# Patient Record
Sex: MALE | Race: WHITE | Employment: OTHER | URBAN - METROPOLITAN AREA
[De-identification: names, ages, dates, MRNs, and addresses within clinical notes are randomized per-mention and may not be internally consistent; named-entity substitution may affect disease eponyms.]

---

## 2020-06-12 ENCOUNTER — HOSPITAL ENCOUNTER (EMERGENCY)
Age: 75
Discharge: HOME | End: 2020-06-13
Attending: EMERGENCY MEDICINE
Payer: MEDICARE

## 2020-06-12 PROCEDURE — 12013 RPR F/E/E/N/L/M 2.6-5.0 CM: CPT

## 2020-06-12 PROCEDURE — 99284 EMERGENCY DEPT VISIT MOD MDM: CPT

## 2020-06-13 ENCOUNTER — APPOINTMENT (OUTPATIENT)
Dept: CT IMAGING | Age: 75
End: 2020-06-13
Payer: MEDICARE

## 2020-06-13 VITALS
RESPIRATION RATE: 16 BRPM | TEMPERATURE: 98 F | SYSTOLIC BLOOD PRESSURE: 149 MMHG | HEART RATE: 65 BPM | WEIGHT: 164 LBS | DIASTOLIC BLOOD PRESSURE: 88 MMHG | OXYGEN SATURATION: 97 %

## 2020-06-13 LAB
ANION GAP SERPL CALCULATED.3IONS-SCNC: 21 MMOL/L (ref 7–16)
BASOPHILS ABSOLUTE: 0.19 E9/L (ref 0–0.2)
BASOPHILS RELATIVE PERCENT: 2.6 % (ref 0–2)
BUN BLDV-MCNC: 13 MG/DL (ref 8–23)
BURR CELLS: ABNORMAL
CALCIUM SERPL-MCNC: 8.7 MG/DL (ref 8.6–10.2)
CHLORIDE BLD-SCNC: 99 MMOL/L (ref 98–107)
CO2: 13 MMOL/L (ref 22–29)
CREAT SERPL-MCNC: 1 MG/DL (ref 0.7–1.2)
EOSINOPHILS ABSOLUTE: 0.06 E9/L (ref 0.05–0.5)
EOSINOPHILS RELATIVE PERCENT: 0.9 % (ref 0–6)
GFR AFRICAN AMERICAN: >60
GFR NON-AFRICAN AMERICAN: >60 ML/MIN/1.73
GLUCOSE BLD-MCNC: 99 MG/DL (ref 74–99)
HCT VFR BLD CALC: 43.3 % (ref 37–54)
HEMOGLOBIN: 14.8 G/DL (ref 12.5–16.5)
LYMPHOCYTES ABSOLUTE: 2.16 E9/L (ref 1.5–4)
LYMPHOCYTES RELATIVE PERCENT: 30.4 % (ref 20–42)
MCH RBC QN AUTO: 34.3 PG (ref 26–35)
MCHC RBC AUTO-ENTMCNC: 34.2 % (ref 32–34.5)
MCV RBC AUTO: 100.5 FL (ref 80–99.9)
MONOCYTES ABSOLUTE: 0.65 E9/L (ref 0.1–0.95)
MONOCYTES RELATIVE PERCENT: 8.7 % (ref 2–12)
NEUTROPHILS ABSOLUTE: 4.1 E9/L (ref 1.8–7.3)
NEUTROPHILS RELATIVE PERCENT: 57.4 % (ref 43–80)
OVALOCYTES: ABNORMAL
PDW BLD-RTO: 12.3 FL (ref 11.5–15)
PLATELET # BLD: 193 E9/L (ref 130–450)
PMV BLD AUTO: 10.3 FL (ref 7–12)
POIKILOCYTES: ABNORMAL
POLYCHROMASIA: ABNORMAL
POTASSIUM SERPL-SCNC: 3.5 MMOL/L (ref 3.5–5)
RBC # BLD: 4.31 E12/L (ref 3.8–5.8)
SODIUM BLD-SCNC: 133 MMOL/L (ref 132–146)
TROPONIN: <0.01 NG/ML (ref 0–0.03)
WBC # BLD: 7.2 E9/L (ref 4.5–11.5)

## 2020-06-13 PROCEDURE — 90471 IMMUNIZATION ADMIN: CPT | Performed by: PHYSICIAN ASSISTANT

## 2020-06-13 PROCEDURE — 84484 ASSAY OF TROPONIN QUANT: CPT

## 2020-06-13 PROCEDURE — 72125 CT NECK SPINE W/O DYE: CPT

## 2020-06-13 PROCEDURE — 6360000002 HC RX W HCPCS: Performed by: PHYSICIAN ASSISTANT

## 2020-06-13 PROCEDURE — 70486 CT MAXILLOFACIAL W/O DYE: CPT

## 2020-06-13 PROCEDURE — 80048 BASIC METABOLIC PNL TOTAL CA: CPT

## 2020-06-13 PROCEDURE — 36415 COLL VENOUS BLD VENIPUNCTURE: CPT

## 2020-06-13 PROCEDURE — 90715 TDAP VACCINE 7 YRS/> IM: CPT | Performed by: PHYSICIAN ASSISTANT

## 2020-06-13 PROCEDURE — 70450 CT HEAD/BRAIN W/O DYE: CPT

## 2020-06-13 PROCEDURE — 93005 ELECTROCARDIOGRAM TRACING: CPT | Performed by: PHYSICIAN ASSISTANT

## 2020-06-13 PROCEDURE — 85025 COMPLETE CBC W/AUTO DIFF WBC: CPT

## 2020-06-13 RX ORDER — APREMILAST 30 MG/1
TABLET, FILM COATED ORAL
COMMUNITY
End: 2022-06-09

## 2020-06-13 RX ADMIN — TETANUS TOXOID, REDUCED DIPHTHERIA TOXOID AND ACELLULAR PERTUSSIS VACCINE, ADSORBED 0.5 ML: 5; 2.5; 8; 8; 2.5 SUSPENSION INTRAMUSCULAR at 01:19

## 2020-06-13 NOTE — ED PROVIDER NOTES
Interpretation: Normal.   ED Triage Vitals [06/13/20 0005]   BP Temp Temp Source Pulse Resp SpO2 Height Weight   133/82 98 °F (36.7 °C) Oral 70 16 97 % -- 164 lb (74.4 kg)       Physical Exam  · Constitutional/General: Alert and oriented x3, well appearing, non toxic. · HEENT: Patient has a 3.0 cm laceration to the right eyebrow with some mild superior orbital swelling. No tenderness to palpation in this area. Extraocular eye movements are intact bilaterally without any evidence of entrapment. PERRLA,  Airway patent. No other bony facial tenderness to palpation, no other lacerations. There is a slight abrasion to the bridge of the nose without any active bleeding or laceration present. · Neck: Patient is in a c-collar. Denies any midline cervical tenderness to palpation. · Respiratory: Lungs clear to auscultation bilaterally, no wheezes, rales, or rhonchi. Not in respiratory distress  · CV:  Regular rate. Regular rhythm. No murmurs, gallops, or rubs. 2+ distal pulses  · Chest: No chest wall tenderness  · GI:  Abdomen Soft, Non tender, Non distended. +BS. No rebound, guarding, or rigidity. No pulsatile masses. · Musculoskeletal: Moves all extremities x 4. Warm and well perfused, no clubbing, cyanosis, or edema. Capillary refill <3 seconds. No midline tenderness to palpation of the thoracic or lumbar spine. Patient moves all 4 extremities without difficulty. No tenderness to palpation of the lower extremities including the hips bilaterally. · Integument: skin warm and dry. No rashes.    · Lymphatic: no lymphadenopathy noted  · Neurologic: GCS 15, no focal deficits, symmetric strength 5/5 in the upper and lower extremities bilaterally  · Psychiatric: Normal Affect      Lab / Imaging Results   (All laboratory and radiology results have been personally reviewed by myself)  Labs:  Results for orders placed or performed during the hospital encounter of 71/18/03   Basic Metabolic Panel   Result Value Ref Range    Sodium 133 132 - 146 mmol/L    Potassium 3.5 3.5 - 5.0 mmol/L    Chloride 99 98 - 107 mmol/L    CO2 13 (L) 22 - 29 mmol/L    Anion Gap 21 (H) 7 - 16 mmol/L    Glucose 99 74 - 99 mg/dL    BUN 13 8 - 23 mg/dL    CREATININE 1.0 0.7 - 1.2 mg/dL    GFR Non-African American >60 >=60 mL/min/1.73    GFR African American >60     Calcium 8.7 8.6 - 10.2 mg/dL   Troponin   Result Value Ref Range    Troponin <0.01 0.00 - 0.03 ng/mL   CBC Auto Differential   Result Value Ref Range    WBC 7.2 4.5 - 11.5 E9/L    RBC 4.31 3.80 - 5.80 E12/L    Hemoglobin 14.8 12.5 - 16.5 g/dL    Hematocrit 43.3 37.0 - 54.0 %    .5 (H) 80.0 - 99.9 fL    MCH 34.3 26.0 - 35.0 pg    MCHC 34.2 32.0 - 34.5 %    RDW 12.3 11.5 - 15.0 fL    Platelets 864 382 - 418 E9/L    MPV 10.3 7.0 - 12.0 fL    Neutrophils % 57.4 43.0 - 80.0 %    Lymphocytes % 30.4 20.0 - 42.0 %    Monocytes % 8.7 2.0 - 12.0 %    Eosinophils % 0.9 0.0 - 6.0 %    Basophils % 2.6 (H) 0.0 - 2.0 %    Neutrophils Absolute 4.10 1.80 - 7.30 E9/L    Lymphocytes Absolute 2.16 1.50 - 4.00 E9/L    Monocytes Absolute 0.65 0.10 - 0.95 E9/L    Eosinophils Absolute 0.06 0.05 - 0.50 E9/L    Basophils Absolute 0.19 0.00 - 0.20 E9/L    Polychromasia 1+     Poikilocytes 1+     [place] Cells 1+     Ovalocytes 1+      Imaging: All Radiology results interpreted by Radiologist unless otherwise noted. CT Head WO Contrast   Final Result   1. No acute intracranial hemorrhage, intra-axial mass, or acute territorial   infarct   2. Mild degree of right periorbital, and right forehead soft tissue swelling,   without evidence of a fracture. CT Cervical Spine WO Contrast   Final Result   No evidence of an acute fracture or traumatic malalignment involving the   cervical spine         CT Facial Bones WO Contrast   Final Result   Multifocal facial soft tissue swelling. No underlying acute facial bone   fracture. Globes and orbits are intact.            EKG #1:  Interpreted by emergency department

## 2020-06-13 NOTE — ED NOTES
Bed: 03  Expected date:   Expected time:   Means of arrival:   Comments:  lanes Kerri Lia, RN  06/12/20 6750

## 2020-06-14 LAB
EKG ATRIAL RATE: 72 BPM
EKG P AXIS: 52 DEGREES
EKG P-R INTERVAL: 170 MS
EKG Q-T INTERVAL: 468 MS
EKG QRS DURATION: 156 MS
EKG QTC CALCULATION (BAZETT): 512 MS
EKG R AXIS: 15 DEGREES
EKG T AXIS: 25 DEGREES
EKG VENTRICULAR RATE: 72 BPM

## 2020-06-14 PROCEDURE — 93010 ELECTROCARDIOGRAM REPORT: CPT | Performed by: INTERNAL MEDICINE

## 2021-02-04 ENCOUNTER — IMMUNIZATION (OUTPATIENT)
Dept: PRIMARY CARE CLINIC | Age: 76
End: 2021-02-04
Payer: MEDICARE

## 2021-02-04 PROCEDURE — 91300 COVID-19, PFIZER VACCINE 30MCG/0.3ML DOSE: CPT | Performed by: NURSE PRACTITIONER

## 2021-02-04 PROCEDURE — 0001A COVID-19, PFIZER VACCINE 30MCG/0.3ML DOSE: CPT | Performed by: NURSE PRACTITIONER

## 2021-02-25 ENCOUNTER — IMMUNIZATION (OUTPATIENT)
Dept: PRIMARY CARE CLINIC | Age: 76
End: 2021-02-25
Payer: MEDICARE

## 2021-02-25 PROCEDURE — 91300 COVID-19, PFIZER VACCINE 30MCG/0.3ML DOSE: CPT | Performed by: NURSE PRACTITIONER

## 2021-02-25 PROCEDURE — 0002A COVID-19, PFIZER VACCINE 30MCG/0.3ML DOSE: CPT | Performed by: NURSE PRACTITIONER

## 2022-06-09 ENCOUNTER — OFFICE VISIT (OUTPATIENT)
Dept: RHEUMATOLOGY | Age: 77
End: 2022-06-09
Payer: MEDICARE

## 2022-06-09 VITALS
HEIGHT: 70 IN | RESPIRATION RATE: 16 BRPM | BODY MASS INDEX: 22.76 KG/M2 | SYSTOLIC BLOOD PRESSURE: 146 MMHG | WEIGHT: 159 LBS | OXYGEN SATURATION: 100 % | HEART RATE: 92 BPM | DIASTOLIC BLOOD PRESSURE: 92 MMHG

## 2022-06-09 DIAGNOSIS — Z79.899 HIGH RISK MEDICATION USE: ICD-10-CM

## 2022-06-09 DIAGNOSIS — D84.9 IMMUNOSUPPRESSION (HCC): ICD-10-CM

## 2022-06-09 DIAGNOSIS — L40.50 PSORIATIC ARTHRITIS (HCC): Primary | ICD-10-CM

## 2022-06-09 DIAGNOSIS — I10 ESSENTIAL HYPERTENSION: ICD-10-CM

## 2022-06-09 DIAGNOSIS — Z11.59 ENCOUNTER FOR SCREENING FOR OTHER VIRAL DISEASES: ICD-10-CM

## 2022-06-09 PROCEDURE — G8420 CALC BMI NORM PARAMETERS: HCPCS | Performed by: INTERNAL MEDICINE

## 2022-06-09 PROCEDURE — G8427 DOCREV CUR MEDS BY ELIG CLIN: HCPCS | Performed by: INTERNAL MEDICINE

## 2022-06-09 PROCEDURE — 4004F PT TOBACCO SCREEN RCVD TLK: CPT | Performed by: INTERNAL MEDICINE

## 2022-06-09 PROCEDURE — 1123F ACP DISCUSS/DSCN MKR DOCD: CPT | Performed by: INTERNAL MEDICINE

## 2022-06-09 PROCEDURE — 99205 OFFICE O/P NEW HI 60 MIN: CPT | Performed by: INTERNAL MEDICINE

## 2022-06-09 RX ORDER — CLOBETASOL PROPIONATE 0.5 MG/G
OINTMENT TOPICAL
COMMUNITY
Start: 2022-05-30 | End: 2022-10-24

## 2022-06-09 RX ORDER — IXEKIZUMAB 80 MG/ML
INJECTION, SOLUTION SUBCUTANEOUS
Qty: 4 EACH | Refills: 1 | Status: SHIPPED | OUTPATIENT
Start: 2022-06-09

## 2022-06-09 ASSESSMENT — ENCOUNTER SYMPTOMS
COLOR CHANGE: 0
ABDOMINAL PAIN: 0
VOMITING: 0
NAUSEA: 0
SHORTNESS OF BREATH: 0
COUGH: 0
DIARRHEA: 0
TROUBLE SWALLOWING: 0

## 2022-06-09 NOTE — PROGRESS NOTES
Rakesh Evans 1945 is a 67 y.o. male, here for evaluation of the following chief complaint(s):  New Patient (psoriatic arthritis, seen Dr. Royal Thorpe )         ASSESSMENT/PLAN:    Rakesh Evans 1945 is a 67 y.o. male seen in consult for psoriatic arthritis. 1.  Psoriatic arthritis-he has obvious active synovitis on exam.  He has minimal psoriasis on the left foot. We will stop Horacio Jena and switch him to [employer]. We discussed the risks, benefits, side effects. I will need to get further work-up as below to get a better characterization of his disease. 2.  Immunosuppression-I recommend he stay up-to-date on vaccinations. In the event of any acute infectious illness he should hold Taltz. 3.  Medication monitoring-we will update TB and hepatitis. 4.  Hypertension-patient's with inflammatory arthritis have an increased cardiovascular risk. He is on Micardis    1. P[facility]atic arthritis ([facility]  -     CBC with Auto Differential; Future  -     Comprehensive Metabolic Panel; Future  -     C-Reactive Protein; Future  -     Sedimentation Rate; Future  -     Rheumatoid Factor; Future  -     Cyclic Citrul Peptide Antibody, IgG; Future  -     Hepatitis B Core Antibody, Total; Future  -     Hepatitis B Surface Antibody; Future  -     Hepatitis B Surface Antigen; Future  -     Hepatitis C Antibody; Future  -     T-Spot TB Test; Future  -     XR HAND LEFT (MIN 3 VIEWS); Future  -     XR HAND RIGHT (MIN 3 VIEWS); Future  -     XR FOOT LEFT (MIN 3 VIEWS); Future  -     XR FOOT RIGHT (MIN 3 VIEWS); Future  2. Immunosuppression ([facility]  -     CBC with Auto Differential; Future  -     Comprehensive Metabolic Panel; Future  -     C-Reactive Protein; Future  -     Sedimentation Rate; Future  -     Rheumatoid Factor; Future  -     Cyclic Citrul Peptide Antibody, IgG; Future  -     Hepatitis B Core Antibody, Total; Future  -     Hepatitis B Surface Antibody; Future  -     Hepatitis B Surface Antigen;  Future  -     Hepatitis C Antibody; Future  -     T-Spot TB Test; Future  3. High risk medication use  -     CBC with Auto Differential; Future  -     Comprehensive Metabolic Panel; Future  -     C-Reactive Protein; Future  -     Sedimentation Rate; Future  -     Rheumatoid Factor; Future  -     Cyclic Citrul Peptide Antibody, IgG; Future  -     Hepatitis B Core Antibody, Total; Future  -     Hepatitis B Surface Antibody; Future  -     Hepatitis B Surface Antigen; Future  -     Hepatitis C Antibody; Future  -     T-Spot TB Test; Future  4. Essential hypertension  -     CBC with Auto Differential; Future  -     Comprehensive Metabolic Panel; Future  -     C-Reactive Protein; Future  -     Sedimentation Rate; Future  -     Rheumatoid Factor; Future  -     Cyclic Citrul Peptide Antibody, IgG; Future  -     Hepatitis B Core Antibody, Total; Future  -     Hepatitis B Surface Antibody; Future  -     Hepatitis B Surface Antigen; Future  -     Hepatitis C Antibody; Future  -     T-Spot TB Test; Future  5. Encounter for screening for other viral diseases   -     Hepatitis B Core Antibody, Total; Future  -     Hepatitis B Surface Antibody; Future  -     Hepatitis B Surface Antigen; Future      Return in about 4 months (around 10/9/2022). Subjective   SUBJECTIVE/OBJECTIVE:    HPI: Santhosh Mckay 1945 is a 67 y.o. male seen in consult for psoriatic arthritis. Patient was diagnosed about 20 years ago initially with joint pain and swelling mainly in the hands and feet as well as the shoulders. He also had diffuse psoriasis at one point. He was briefly on sulfasalazine which was ineffective. He was then on Remicade for some time which worked well but for what ever reason he was switched to [employer]. He has been on [place]ight for several years. He feels like it worked well initially but the last year or so he has had more joint pain and swelling in the hands in particular. He has had a couple flares of his skin disease as well.   Currently has a little bit of scaly skin on the left foot. He does have some swelling in the hands. He has not had uveitis, dactylitis, enthesitis. He has no history of IBD. He has no extra-articular manifestations. Past Medical History:   Diagnosis Date    Arthritis     Psoriatic arthritis ([facility] [address] 2004        Review of Systems   Constitutional: Negative for fatigue and fever. HENT: Negative for mouth sores and trouble swallowing. Respiratory: Negative for cough and shortness of breath. Cardiovascular: Negative for chest pain. Gastrointestinal: Negative for abdominal pain, diarrhea, nausea and vomiting. Genitourinary: Negative for dysuria and hematuria. Musculoskeletal: Positive for arthralgias and joint swelling. Skin: Positive for rash. Negative for color change. Neurological: Negative for weakness and numbness. Hematological: Negative for adenopathy. All other systems reviewed and are negative. Objective   Vitals:    06/09/22 1357   BP: (!) 146/92   Pulse: 92   Resp: 16   SpO2: 100%      Physical Exam  Constitutional:       General: He is not in acute distress. Appearance: Normal appearance. HENT:      Head: Normocephalic and atraumatic. Right Ear: External ear normal.      Left Ear: External ear normal.      Nose: Nose normal.   Eyes:      General: No scleral icterus. Pulmonary:      Effort: Pulmonary effort is normal.   Musculoskeletal:         General: Swelling and tenderness present. No deformity. Comments: He has synovitis in the left wrist and several PIP and DIP joints. Skin:     General: Skin is warm and dry. Findings: Rash present. Comments: He has some psoriasis on the left foot. Neurological:      General: No focal deficit present. Mental Status: He is alert and oriented to person, place, and time. Mental status is at baseline.    Psychiatric:         Mood and Affect: Mood normal.         Behavior: Behavior normal.            Lab Results Component Value Date    WBC 7.2 06/13/2020    HGB 14.8 06/13/2020    HCT 43.3 06/13/2020    .5 (H) 06/13/2020     06/13/2020     Lab Results   Component Value Date     06/13/2020    K 3.5 06/13/2020    CL 99 06/13/2020    CO2 13 (L) 06/13/2020    BUN 13 06/13/2020    CREATININE 1.0 06/13/2020    GLUCOSE 99 06/13/2020    CALCIUM 8.7 06/13/2020    LABGLOM >60 06/13/2020    GFRAA >60 06/13/2020     No results found for: KEVIN  No results found for: RHEUMFACTOR  No results found for: SEDRATE  No results found for: CRP         An electronic signature was used to authenticate this note. This note was generated with a voice recognition dictation system. Please disregard any errors or omission which have escaped my review.     --Umesh Conn, DO

## 2022-06-10 DIAGNOSIS — Z79.899 HIGH RISK MEDICATION USE: ICD-10-CM

## 2022-06-10 DIAGNOSIS — I10 ESSENTIAL HYPERTENSION: ICD-10-CM

## 2022-06-10 DIAGNOSIS — D84.9 IMMUNOSUPPRESSION (HCC): ICD-10-CM

## 2022-06-10 DIAGNOSIS — L40.50 PSORIATIC ARTHRITIS (HCC): ICD-10-CM

## 2022-06-10 DIAGNOSIS — Z11.59 ENCOUNTER FOR SCREENING FOR OTHER VIRAL DISEASES: ICD-10-CM

## 2022-06-10 LAB
ALBUMIN SERPL-MCNC: 4.3 G/DL (ref 3.5–5.2)
ALP BLD-CCNC: 63 U/L (ref 40–129)
ALT SERPL-CCNC: 13 U/L (ref 0–40)
ANION GAP SERPL CALCULATED.3IONS-SCNC: 15 MMOL/L (ref 7–16)
AST SERPL-CCNC: 26 U/L (ref 0–39)
BASOPHILS ABSOLUTE: 0.05 E9/L (ref 0–0.2)
BASOPHILS RELATIVE PERCENT: 0.8 % (ref 0–2)
BILIRUB SERPL-MCNC: 0.4 MG/DL (ref 0–1.2)
BUN BLDV-MCNC: 12 MG/DL (ref 6–23)
C-REACTIVE PROTEIN: 1.4 MG/DL (ref 0–0.4)
CALCIUM SERPL-MCNC: 9.3 MG/DL (ref 8.6–10.2)
CHLORIDE BLD-SCNC: 104 MMOL/L (ref 98–107)
CO2: 22 MMOL/L (ref 22–29)
CREAT SERPL-MCNC: 0.9 MG/DL (ref 0.7–1.2)
EOSINOPHILS ABSOLUTE: 0.07 E9/L (ref 0.05–0.5)
EOSINOPHILS RELATIVE PERCENT: 1.2 % (ref 0–6)
GFR AFRICAN AMERICAN: >60
GFR NON-AFRICAN AMERICAN: >60 ML/MIN/1.73
GLUCOSE BLD-MCNC: 95 MG/DL (ref 74–99)
HCT VFR BLD CALC: 40.4 % (ref 37–54)
HEMOGLOBIN: 13.7 G/DL (ref 12.5–16.5)
IMMATURE GRANULOCYTES #: 0.01 E9/L
IMMATURE GRANULOCYTES %: 0.2 % (ref 0–5)
LYMPHOCYTES ABSOLUTE: 1.67 E9/L (ref 1.5–4)
LYMPHOCYTES RELATIVE PERCENT: 28.4 % (ref 20–42)
MCH RBC QN AUTO: 33.7 PG (ref 26–35)
MCHC RBC AUTO-ENTMCNC: 33.9 % (ref 32–34.5)
MCV RBC AUTO: 99.5 FL (ref 80–99.9)
MONOCYTES ABSOLUTE: 0.59 E9/L (ref 0.1–0.95)
MONOCYTES RELATIVE PERCENT: 10 % (ref 2–12)
NEUTROPHILS ABSOLUTE: 3.5 E9/L (ref 1.8–7.3)
NEUTROPHILS RELATIVE PERCENT: 59.4 % (ref 43–80)
PDW BLD-RTO: 12 FL (ref 11.5–15)
PLATELET # BLD: 220 E9/L (ref 130–450)
PMV BLD AUTO: 10.4 FL (ref 7–12)
POTASSIUM SERPL-SCNC: 4.2 MMOL/L (ref 3.5–5)
RBC # BLD: 4.06 E12/L (ref 3.8–5.8)
RHEUMATOID FACTOR: 11 IU/ML (ref 0–13)
SEDIMENTATION RATE, ERYTHROCYTE: 38 MM/HR (ref 0–15)
SODIUM BLD-SCNC: 141 MMOL/L (ref 132–146)
TOTAL PROTEIN: 7.9 G/DL (ref 6.4–8.3)
WBC # BLD: 5.9 E9/L (ref 4.5–11.5)

## 2022-06-11 LAB — HEPATITIS B CORE TOTAL ANTIBODY: NONREACTIVE

## 2022-06-12 LAB — CYCLIC CITRULLINATED PEPTIDE ANTIBODY IGG: 1.2 U/ML (ref 0–7)

## 2022-06-13 LAB
HBV SURFACE AB TITR SER: NORMAL {TITER}
HEPATITIS B SURFACE ANTIGEN INTERPRETATION: NORMAL
HEPATITIS C ANTIBODY INTERPRETATION: NORMAL

## 2022-06-15 LAB
COMMENT: NORMAL
REPORT: NORMAL

## 2022-06-22 ENCOUNTER — TELEPHONE (OUTPATIENT)
Dept: RHEUMATOLOGY | Age: 77
End: 2022-06-22

## 2022-10-24 ENCOUNTER — HOSPITAL ENCOUNTER (OUTPATIENT)
Dept: ULTRASOUND IMAGING | Age: 77
Discharge: HOME | End: 2022-10-24
Payer: MEDICARE

## 2022-10-24 ENCOUNTER — TELEPHONE (OUTPATIENT)
Dept: RHEUMATOLOGY | Age: 77
End: 2022-10-24

## 2022-10-24 ENCOUNTER — OFFICE VISIT (OUTPATIENT)
Dept: RHEUMATOLOGY | Age: 77
End: 2022-10-24
Payer: MEDICARE

## 2022-10-24 VITALS
BODY MASS INDEX: 23.19 KG/M2 | SYSTOLIC BLOOD PRESSURE: 158 MMHG | HEART RATE: 96 BPM | HEIGHT: 70 IN | OXYGEN SATURATION: 99 % | RESPIRATION RATE: 18 BRPM | DIASTOLIC BLOOD PRESSURE: 86 MMHG | WEIGHT: 162 LBS

## 2022-10-24 DIAGNOSIS — R22.42 LOCALIZED SWELLING OF LEFT LOWER LEG: ICD-10-CM

## 2022-10-24 DIAGNOSIS — L40.50 PSORIATIC ARTHRITIS (HCC): Primary | ICD-10-CM

## 2022-10-24 DIAGNOSIS — D84.9 IMMUNOSUPPRESSION (HCC): ICD-10-CM

## 2022-10-24 DIAGNOSIS — I10 ESSENTIAL HYPERTENSION: ICD-10-CM

## 2022-10-24 DIAGNOSIS — Z79.899 HIGH RISK MEDICATION USE: ICD-10-CM

## 2022-10-24 PROCEDURE — G8427 DOCREV CUR MEDS BY ELIG CLIN: HCPCS | Performed by: INTERNAL MEDICINE

## 2022-10-24 PROCEDURE — G8420 CALC BMI NORM PARAMETERS: HCPCS | Performed by: INTERNAL MEDICINE

## 2022-10-24 PROCEDURE — 99215 OFFICE O/P EST HI 40 MIN: CPT | Performed by: INTERNAL MEDICINE

## 2022-10-24 PROCEDURE — G8484 FLU IMMUNIZE NO ADMIN: HCPCS | Performed by: INTERNAL MEDICINE

## 2022-10-24 PROCEDURE — 93971 EXTREMITY STUDY: CPT

## 2022-10-24 PROCEDURE — 4004F PT TOBACCO SCREEN RCVD TLK: CPT | Performed by: INTERNAL MEDICINE

## 2022-10-24 PROCEDURE — 1123F ACP DISCUSS/DSCN MKR DOCD: CPT | Performed by: INTERNAL MEDICINE

## 2022-10-24 ASSESSMENT — ENCOUNTER SYMPTOMS
SHORTNESS OF BREATH: 0
ABDOMINAL PAIN: 0
COUGH: 0
VOMITING: 0
TROUBLE SWALLOWING: 0
NAUSEA: 0
COLOR CHANGE: 0
DIARRHEA: 0

## 2022-10-24 NOTE — TELEPHONE ENCOUNTER
Patient is scheduled for STAT U/S at [facility] for today, 10/24/22 at 430 but can go to the hospital now for his test. Order req was given to the patient with the hold and call instructions noted on the order for the radiology dept. Patient voiced understanding of instructions.     Electronically signed by Debbie Corona MA on 10/24/2022 at 3:49 PM

## 2022-10-24 NOTE — PROGRESS NOTES
Lynda Garcia 1945 is a 67 y.o. male, here for evaluation of the following chief complaint(s):  Follow-up (Patient here for a follow up on psoriatic arthritis. )         ASSESSMENT/PLAN:    Lynda Garcia 1945 is a 67 y.o. male seen in follow-up for psoriatic arthritis. 1.  Psoriatic arthritis-subjectively he has had a good response from a joint standpoint Taltz but does still have some synovitis in the hands. Shortly after starting Win Justin however he also developed erythematous flaky skin on the lower extremities left greater than right as well as edema. We will stop Win Justin out of concern for possible adverse reaction/infection. There is concern for clot versus cellulitis. We will check a stat ultrasound. He will get in with his PCP right away to potentially start a course of antibiotics. We will see him back in a month and consider Tremfya. 2.  Immunosuppression-I recommend he stay up-to-date on vaccinations. We will hold off on the COVID booster for right now until we get things sorted out with his left lower extremity. 3.  Medication monitoring-up-to-date on TB and hepatitis. 4.  Hypertension-patient's with inflammatory arthritis have an increased cardiovascular risk. He is on Micardis. 5.  Left lower extremity swelling-rule out DVT versus cellulitis. We will check a stat ultrasound. Also advised that he get into see his PCP right away. We will stop Toltz. 1. Psoriatic arthritis ([address]  2. Immunosuppression ([address]  3. High risk medication use  4. Essential hypertension  5. Localized swelling of left lower leg  -     US DUP LOWER EXTREMITY LEFT CLEMENT; Future      Return in about 4 weeks (around 11/21/2022). Subjective   SUBJECTIVE/OBJECTIVE:    HPI: Lynda Garcia 1945 is a 67 y.o. male seen in follow-up for psoriatic arthritis.   Last visit we started him on Taltz and he has done much better from a joint standpoint but shortly after starting Taltz developed flaky skin, erythema, and swelling of the left lower extremity and to a lesser degree the right lower extremity. Initial history: Patient was diagnosed about 20 years ago initially with joint pain and swelling mainly in the hands and feet as well as the shoulders. He also had diffuse psoriasis at one point. He was briefly on sulfasalazine which was ineffective. He was then on Remicade for some time which worked well but for what ever reason he was switched to [employer]. He has been on [employer] for several years. He feels like it worked well initially but the last year or so he has had more joint pain and swelling in the hands in particular. He has had a couple flares of his skin disease as well. Currently has a little bit of scaly skin on the left foot. He does have some swelling in the hands. He has not had uveitis, dactylitis, enthesitis. He has no history of IBD. He has no extra-articular manifestations. Past Medical History:   Diagnosis Date    Arthritis     Psoriatic arthritis ([facility] [address] 2004        Review of Systems   Constitutional:  Negative for fatigue and fever. HENT:  Negative for mouth sores and trouble swallowing. Respiratory:  Negative for cough and shortness of breath. Cardiovascular:  Positive for leg swelling. Negative for chest pain. Gastrointestinal:  Negative for abdominal pain, diarrhea, nausea and vomiting. Genitourinary:  Negative for dysuria and hematuria. Musculoskeletal:  Positive for arthralgias and joint swelling. Skin:  Positive for rash. Negative for color change. Neurological:  Negative for weakness and numbness. Hematological:  Negative for adenopathy. All other systems reviewed and are negative. Objective   Vitals:    10/24/22 1459   BP: (!) 158/86   Pulse: 96   Resp: 18   SpO2: 99%      Physical Exam  Constitutional:       General: He is not in acute distress. Appearance: Normal appearance. HENT:      Head: Normocephalic and atraumatic. Right Ear: External ear normal.      Left Ear: External ear normal.      Nose: Nose normal.   Eyes:      General: No scleral icterus. Pulmonary:      Effort: Pulmonary effort is normal.   Musculoskeletal:         General: Swelling and tenderness present. No deformity. Comments: He has synovitis in the left wrist and several PIP and DIP joints though improved from previous visit. Skin:     General: Skin is warm and dry. Findings: Rash present. Comments: There is edema, erythema, flaky skin, warmth, tenderness of the left lower extremity and to a lesser degree the right lower extremity. Neurological:      General: No focal deficit present. Mental Status: He is alert and oriented to person, place, and time. Mental status is at baseline. Psychiatric:         Mood and Affect: Mood normal.         Behavior: Behavior normal.          Lab Results   Component Value Date    WBC 5.9 06/10/2022    HGB 13.7 06/10/2022    HCT 40.4 06/10/2022    MCV 99.5 06/10/2022     06/10/2022     Lab Results   Component Value Date     06/10/2022    K 4.2 06/10/2022     06/10/2022    CO2 22 06/10/2022    BUN 12 06/10/2022    CREATININE 0.9 06/10/2022    GLUCOSE 95 06/10/2022    CALCIUM 9.3 06/10/2022    PROT 7.9 06/10/2022    LABALBU 4.3 06/10/2022    BILITOT 0.4 06/10/2022    ALKPHOS 63 06/10/2022    AST 26 06/10/2022    ALT 13 06/10/2022    LABGLOM >60 06/10/2022    GFRAA >60 06/10/2022     No results found for: KEVIN  No results found for: RHEUMFACTOR  Lab Results   Component Value Date    SEDRATE 38 (H) 06/10/2022     Lab Results   Component Value Date    CRP 1.4 (H) 06/10/2022            An electronic signature was used to authenticate this note. This note was generated with a voice recognition dictation system. Please disregard any errors or omission which have escaped my review.     --Moises Needs, DO

## 2022-11-28 ENCOUNTER — OFFICE VISIT (OUTPATIENT)
Dept: RHEUMATOLOGY | Age: 77
End: 2022-11-28
Payer: MEDICARE

## 2022-11-28 VITALS — BODY MASS INDEX: 23.19 KG/M2 | WEIGHT: 162 LBS | HEIGHT: 70 IN

## 2022-11-28 DIAGNOSIS — Z79.899 HIGH RISK MEDICATION USE: ICD-10-CM

## 2022-11-28 DIAGNOSIS — I10 ESSENTIAL HYPERTENSION: ICD-10-CM

## 2022-11-28 DIAGNOSIS — D84.9 IMMUNOSUPPRESSION (HCC): ICD-10-CM

## 2022-11-28 DIAGNOSIS — L40.50 PSORIATIC ARTHRITIS (HCC): Primary | ICD-10-CM

## 2022-11-28 PROCEDURE — G8427 DOCREV CUR MEDS BY ELIG CLIN: HCPCS | Performed by: INTERNAL MEDICINE

## 2022-11-28 PROCEDURE — G8420 CALC BMI NORM PARAMETERS: HCPCS | Performed by: INTERNAL MEDICINE

## 2022-11-28 PROCEDURE — G8484 FLU IMMUNIZE NO ADMIN: HCPCS | Performed by: INTERNAL MEDICINE

## 2022-11-28 PROCEDURE — 1123F ACP DISCUSS/DSCN MKR DOCD: CPT | Performed by: INTERNAL MEDICINE

## 2022-11-28 PROCEDURE — 99215 OFFICE O/P EST HI 40 MIN: CPT | Performed by: INTERNAL MEDICINE

## 2022-11-28 PROCEDURE — 4004F PT TOBACCO SCREEN RCVD TLK: CPT | Performed by: INTERNAL MEDICINE

## 2022-11-28 RX ORDER — RISANKIZUMAB-RZAA 150 MG/ML
INJECTION SUBCUTANEOUS
Qty: 3 ML | Refills: 1 | Status: SHIPPED | OUTPATIENT
Start: 2022-11-28

## 2022-11-28 ASSESSMENT — ENCOUNTER SYMPTOMS
COUGH: 0
NAUSEA: 0
DIARRHEA: 0
SHORTNESS OF BREATH: 0
TROUBLE SWALLOWING: 0
ABDOMINAL PAIN: 0
COLOR CHANGE: 0
VOMITING: 0

## 2022-11-28 NOTE — PROGRESS NOTES
Khurram Horner 1945 is a 65 y.o. male, here for evaluation of the following chief complaint(s):  Follow-up (Psoriatic arthritis Things are getting better patient states.)         ASSESSMENT/PLAN:    Khurram Horner 1945 is a 65 y.o. male seen in follow-up for psoriatic arthritis. 1.  Psoriatic arthritis-last visit he seemed to have a skin reaction to [address] complicated by cellulitis particularly in the left lower extremity. We did rule out a DVT. He saw his PCP and was placed on antibiotics and now doing much better. We will remain off of Taltz. We will start him on Skyrizi. We discussed the risks, benefits, side effects. He has active synovitis in the hands today and does have some evidence of damage on x-rays. We will update inflammation markers. 2.  Immunosuppression-I recommend he stay up-to-date on vaccinations. In the event of any acute infectious illness he should hold Skyrizi. 3.  Medication monitoring-up-to-date on TB and hepatitis. 4.  Hypertension-patient's with inflammatory arthritis have an increased cardiovascular risk. He is on Micardis. 1. Psoriatic arthritis (HCC)  -     C-Reactive Protein; Future  -     Sedimentation Rate; Future  2. Immunosuppression (HCC)  -     C-Reactive Protein; Future  -     Sedimentation Rate; Future  3. High risk medication use  4. Essential hypertension      Return in about 5 months (around 4/28/2023). Subjective   SUBJECTIVE/OBJECTIVE:    HPI: Khurram Horner 1945 is a 65 y.o. male seen in follow-up for psoriatic arthritis. Last visit he seemed to have a skin reaction which was ultimately complicated by cellulitis on Taltz. We have stopped his Taltz. He has seen his PCP and started on antibiotics and has had significant improvement. He feels like he is doing pretty well still from a joint skin standpoint otherwise.       Initial history: Patient was diagnosed about 20 years ago initially with joint pain and swelling mainly in the hands and feet as well as the shoulders. He also had diffuse psoriasis at one point. He was briefly on sulfasalazine which was ineffective. He was then on Remicade for some time which worked well but for what ever reason he was switched to [employer]. He has been on [employer] for several years. He feels like it worked well initially but the last year or so he has had more joint pain and swelling in the hands in particular. He has had a couple flares of his skin disease as well. Currently has a little bit of scaly skin on the left foot. He does have some swelling in the hands. He has not had uveitis, dactylitis, enthesitis. He has no history of IBD. He has no extra-articular manifestations. Past Medical History:   Diagnosis Date    Arthritis     Psoriatic arthritis ([facility] [address] 2004        Review of Systems   Constitutional:  Negative for fatigue and fever. HENT:  Negative for mouth sores and trouble swallowing. Respiratory:  Negative for cough and shortness of breath. Cardiovascular:  Negative for chest pain. Gastrointestinal:  Negative for abdominal pain, diarrhea, nausea and vomiting. Genitourinary:  Negative for dysuria and hematuria. Musculoskeletal:  Positive for joint swelling. Negative for arthralgias. Skin:  Positive for rash. Negative for color change. Neurological:  Negative for weakness and numbness. Hematological:  Negative for adenopathy. All other systems reviewed and are negative. Objective   There were no vitals filed for this visit. Physical Exam  Constitutional:       General: He is not in acute distress. Appearance: Normal appearance. HENT:      Head: Normocephalic and atraumatic. Right Ear: External ear normal.      Left Ear: External ear normal.      Nose: Nose normal.   Eyes:      General: No scleral icterus. Pulmonary:      Effort: Pulmonary effort is normal.   Musculoskeletal:         General: Swelling present.  No tenderness or deformity. Comments: He has synovitis in the left wrist and several PIP and DIP joints but none of them are tender. Skin:     General: Skin is warm and dry. Findings: Rash present. Comments: He still has some hyperpigmentation to the skin of the left lower extremity but significantly improved from last visit. He does have a little psoriasis on the right ankle. Neurological:      General: No focal deficit present. Mental Status: He is alert and oriented to person, place, and time. Mental status is at baseline. Psychiatric:         Mood and Affect: Mood normal.         Behavior: Behavior normal.          Lab Results   Component Value Date    WBC 5.9 06/10/2022    HGB 13.7 06/10/2022    HCT 40.4 06/10/2022    MCV 99.5 06/10/2022     06/10/2022     Lab Results   Component Value Date     06/10/2022    K 4.2 06/10/2022     06/10/2022    CO2 22 06/10/2022    BUN 12 06/10/2022    CREATININE 0.9 06/10/2022    GLUCOSE 95 06/10/2022    CALCIUM 9.3 06/10/2022    PROT 7.9 06/10/2022    LABALBU 4.3 06/10/2022    BILITOT 0.4 06/10/2022    ALKPHOS 63 06/10/2022    AST 26 06/10/2022    ALT 13 06/10/2022    LABGLOM >60 06/10/2022    GFRAA >60 06/10/2022     No results found for: KEVIN  No results found for: RHEUMFACTOR  Lab Results   Component Value Date    SEDRATE 38 (H) 06/10/2022     Lab Results   Component Value Date    CRP 1.4 (H) 06/10/2022            An electronic signature was used to authenticate this note. This note was generated with a voice recognition dictation system. Please disregard any errors or omission which have escaped my review.     --Ed Cisse,

## 2022-12-05 ENCOUNTER — TELEPHONE (OUTPATIENT)
Dept: RHEUMATOLOGY | Age: 77
End: 2022-12-05

## 2022-12-05 NOTE — TELEPHONE ENCOUNTER
Patient's medication Skyrizi needed a prior Bryce Cash. It has been approved. HZDZYN:[id number];  Status:Approved, Coverage Start Date:11/05/2022, End Date:06/03/2023    Patient is notified of the approval and to contact the pharmacy to get medication. Patient voiced understanding.     Electronically signed by Donna Cheung CMA on 12/5/2022 at 2:22 PM

## 2023-03-02 ENCOUNTER — OFFICE VISIT (OUTPATIENT)
Dept: RHEUMATOLOGY | Age: 78
End: 2023-03-02
Payer: MEDICARE

## 2023-03-02 VITALS — BODY MASS INDEX: 23.19 KG/M2 | WEIGHT: 162 LBS | HEIGHT: 70 IN

## 2023-03-02 DIAGNOSIS — L40.50 PSORIATIC ARTHRITIS (HCC): Primary | ICD-10-CM

## 2023-03-02 DIAGNOSIS — I10 ESSENTIAL HYPERTENSION: ICD-10-CM

## 2023-03-02 DIAGNOSIS — Z79.899 HIGH RISK MEDICATION USE: ICD-10-CM

## 2023-03-02 DIAGNOSIS — L40.50 PSORIATIC ARTHRITIS (HCC): ICD-10-CM

## 2023-03-02 DIAGNOSIS — D84.9 IMMUNOSUPPRESSION (HCC): ICD-10-CM

## 2023-03-02 LAB
ALBUMIN SERPL-MCNC: 4.3 G/DL (ref 3.5–5.2)
ALP BLD-CCNC: 69 U/L (ref 40–129)
ALT SERPL-CCNC: 24 U/L (ref 0–40)
ANION GAP SERPL CALCULATED.3IONS-SCNC: 9 MMOL/L (ref 7–16)
AST SERPL-CCNC: 37 U/L (ref 0–39)
BASOPHILS ABSOLUTE: 0.05 E9/L (ref 0–0.2)
BASOPHILS RELATIVE PERCENT: 0.7 % (ref 0–2)
BILIRUB SERPL-MCNC: 0.6 MG/DL (ref 0–1.2)
BUN BLDV-MCNC: 17 MG/DL (ref 6–23)
C-REACTIVE PROTEIN: 0.9 MG/DL (ref 0–0.4)
CALCIUM SERPL-MCNC: 9.5 MG/DL (ref 8.6–10.2)
CHLORIDE BLD-SCNC: 104 MMOL/L (ref 98–107)
CO2: 25 MMOL/L (ref 22–29)
CREAT SERPL-MCNC: 1.1 MG/DL (ref 0.7–1.2)
EOSINOPHILS ABSOLUTE: 0.04 E9/L (ref 0.05–0.5)
EOSINOPHILS RELATIVE PERCENT: 0.6 % (ref 0–6)
GFR SERPL CREATININE-BSD FRML MDRD: >60 ML/MIN/1.73
GLUCOSE BLD-MCNC: 97 MG/DL (ref 74–99)
HCT VFR BLD CALC: 39.3 % (ref 37–54)
HEMOGLOBIN: 13.7 G/DL (ref 12.5–16.5)
IMMATURE GRANULOCYTES #: 0.01 E9/L
IMMATURE GRANULOCYTES %: 0.1 % (ref 0–5)
LYMPHOCYTES ABSOLUTE: 1.7 E9/L (ref 1.5–4)
LYMPHOCYTES RELATIVE PERCENT: 24.6 % (ref 20–42)
MCH RBC QN AUTO: 34.5 PG (ref 26–35)
MCHC RBC AUTO-ENTMCNC: 34.9 % (ref 32–34.5)
MCV RBC AUTO: 99 FL (ref 80–99.9)
MONOCYTES ABSOLUTE: 0.72 E9/L (ref 0.1–0.95)
MONOCYTES RELATIVE PERCENT: 10.4 % (ref 2–12)
NEUTROPHILS ABSOLUTE: 4.39 E9/L (ref 1.8–7.3)
NEUTROPHILS RELATIVE PERCENT: 63.6 % (ref 43–80)
PDW BLD-RTO: 11.9 FL (ref 11.5–15)
PLATELET # BLD: 195 E9/L (ref 130–450)
PMV BLD AUTO: 11.7 FL (ref 7–12)
POTASSIUM SERPL-SCNC: 3.6 MMOL/L (ref 3.5–5)
RBC # BLD: 3.97 E12/L (ref 3.8–5.8)
SODIUM BLD-SCNC: 138 MMOL/L (ref 132–146)
TOTAL PROTEIN: 7.9 G/DL (ref 6.4–8.3)
WBC # BLD: 6.9 E9/L (ref 4.5–11.5)

## 2023-03-02 PROCEDURE — 4004F PT TOBACCO SCREEN RCVD TLK: CPT | Performed by: INTERNAL MEDICINE

## 2023-03-02 PROCEDURE — G8484 FLU IMMUNIZE NO ADMIN: HCPCS | Performed by: INTERNAL MEDICINE

## 2023-03-02 PROCEDURE — 1123F ACP DISCUSS/DSCN MKR DOCD: CPT | Performed by: INTERNAL MEDICINE

## 2023-03-02 PROCEDURE — 99215 OFFICE O/P EST HI 40 MIN: CPT | Performed by: INTERNAL MEDICINE

## 2023-03-02 PROCEDURE — G8428 CUR MEDS NOT DOCUMENT: HCPCS | Performed by: INTERNAL MEDICINE

## 2023-03-02 PROCEDURE — G8420 CALC BMI NORM PARAMETERS: HCPCS | Performed by: INTERNAL MEDICINE

## 2023-03-02 RX ORDER — CLOBETASOL PROPIONATE 0.5 MG/G
OINTMENT TOPICAL
Qty: 30 G | Refills: 3 | Status: SHIPPED | OUTPATIENT
Start: 2023-03-02

## 2023-03-02 ASSESSMENT — ENCOUNTER SYMPTOMS
ABDOMINAL PAIN: 0
TROUBLE SWALLOWING: 0
COUGH: 0
NAUSEA: 0
SHORTNESS OF BREATH: 0
VOMITING: 0
DIARRHEA: 0
COLOR CHANGE: 0

## 2023-03-02 NOTE — PATIENT INSTRUCTIONS
Will stick with Skyrizi for now    OK to use clobetasol twice per day for up to 2 weeks on/2 weeks off

## 2023-03-02 NOTE — PROGRESS NOTES
Karl Koenig 1945 is a 65 y.o. male, here for evaluation of the following chief complaint(s):  Follow-up (Patient here d/t questionable reaction to medication, left foot red. )         ASSESSMENT/PLAN:    Karl Koenig 1945 is a 65 y.o. male seen in follow-up for psoriatic arthritis. 1.  Psoriatic arthritis-last visit he seemed to have a skin reaction to [address] complicated by cellulitis particularly in the left lower extremity. So we switched him to [employer]. He has had a pretty good response. He does still have a little synovitis in the right second and left third and fourth PIP joints. His skin is clear. Overall however he is significantly improved so we will not make any changes today. I did also give him some clobetasol topical to use twice daily up to 2 weeks on/2 weeks off.    2.  Immunosuppression-I recommend he stay up-to-date on vaccinations. In the event of any acute infectious illness he should hold Skyrizi. He states that the left foot was a little red yesterday concerning for infection similar to previous. Today actually it looks good. I see no signs of infection but advised that if he notices any worsening he should get in touch with his PCP for an antibiotic and would obviously need to hold Skyrizi if he is due. 3.  Medication monitoring-up-to-date on TB and hepatitis. We will update basic blood work today. 4.  Hypertension-patient's with inflammatory arthritis have an increased cardiovascular risk. He is on Micardis. 1. Psoriatic arthritis ([facility] [address]  -     Comprehensive Metabolic Panel; Future  -     CBC with Auto Differential; Future  -     C-Reactive Protein; Future  -     Sedimentation Rate; Future  2. Immunosuppression ([address]  3. High risk medication use  -     Comprehensive Metabolic Panel; Future  -     CBC with Auto Differential; Future  -     C-Reactive Protein; Future  -     Sedimentation Rate; Future  4.  Essential hypertension      Return in about 3 months (around 6/2/2023). Subjective   SUBJECTIVE/OBJECTIVE:    HPI: Tapan Zuniga 1945 is a 65 y.o. male seen in follow-up for psoriatic arthritis. Previously he had a skin reaction and cellulitis with [employer] so we switched him to [employer]. He has had a good response. He states that his joint pain and swelling has improved significantly and his skin is clear. He did come in today because yesterday he noticed that his left foot was getting red and was concerned for a similar reaction to what he had before. Today it actually looks pretty good. Initial history: Patient was diagnosed about 20 years ago initially with joint pain and swelling mainly in the hands and feet as well as the shoulders. He also had diffuse psoriasis at one point. He was briefly on sulfasalazine which was ineffective. He was then on Remicade for some time which worked well but for what ever reason he was switched to [employer]. He has been on [employer] [place] for several years. He feels like it worked well initially but the last year or so he has had more joint pain and swelling in the hands in particular. He has had a couple flares of his skin disease as well. Currently has a little bit of scaly skin on the left foot. He does have some swelling in the hands. He has not had uveitis, dactylitis, enthesitis. He has no history of IBD. He has no extra-articular manifestations. Past Medical History:   Diagnosis Date    Arthritis     Psoriatic arthritis ([facility] [address] 2004        Review of Systems   Constitutional:  Negative for fatigue and fever. HENT:  Negative for mouth sores and trouble swallowing. Respiratory:  Negative for cough and shortness of breath. Cardiovascular:  Negative for chest pain. Gastrointestinal:  Negative for abdominal pain, diarrhea, nausea and vomiting. Genitourinary:  Negative for dysuria and hematuria. Musculoskeletal:  Positive for joint swelling. Negative for arthralgias.    Skin:  Positive for rash. Negative for color change. Neurological:  Negative for weakness and numbness. Hematological:  Negative for adenopathy. All other systems reviewed and are negative. Objective   There were no vitals filed for this visit. Physical Exam  Constitutional:       General: He is not in acute distress. Appearance: Normal appearance. HENT:      Head: Normocephalic and atraumatic. Right Ear: External ear normal.      Left Ear: External ear normal.      Nose: Nose normal.   Eyes:      General: No scleral icterus. Pulmonary:      Effort: Pulmonary effort is normal.   Musculoskeletal:         General: Swelling present. No tenderness or deformity. Comments: He has synovitis in the right second and left third and fourth PIP joints. Skin:     General: Skin is warm and dry. Findings: Rash present. Comments: He has some scaly skin on the left ankle and foot but no signs of infection. Neurological:      General: No focal deficit present. Mental Status: He is alert and oriented to person, place, and time. Mental status is at baseline.    Psychiatric:         Mood and Affect: Mood normal.         Behavior: Behavior normal.          Lab Results   Component Value Date    WBC 5.9 06/10/2022    HGB 13.7 06/10/2022    HCT 40.4 06/10/2022    MCV 99.5 06/10/2022     06/10/2022     Lab Results   Component Value Date     06/10/2022    K 4.2 06/10/2022     06/10/2022    CO2 22 06/10/2022    BUN 12 06/10/2022    CREATININE 0.9 06/10/2022    GLUCOSE 95 06/10/2022    CALCIUM 9.3 06/10/2022    PROT 7.9 06/10/2022    LABALBU 4.3 06/10/2022    BILITOT 0.4 06/10/2022    ALKPHOS 63 06/10/2022    AST 26 06/10/2022    ALT 13 06/10/2022    LABGLOM >60 06/10/2022    GFRAA >60 06/10/2022     No results found for: KEVIN  No results found for: RHEUMFACTOR  Lab Results   Component Value Date    SEDRATE 38 (H) 06/10/2022     Lab Results   Component Value Date    CRP 1.4 (H) 06/10/2022 An electronic signature was used to authenticate this note. This note was generated with a voice recognition dictation system. Please disregard any errors or omission which have escaped my review.     --Asiya De Souza, DO

## 2023-03-03 LAB — SEDIMENTATION RATE, ERYTHROCYTE: 22 MM/HR (ref 0–15)

## 2023-05-05 ENCOUNTER — TELEPHONE (OUTPATIENT)
Dept: RHEUMATOLOGY | Age: 78
End: 2023-05-05

## 2023-05-05 NOTE — TELEPHONE ENCOUNTER
Patient's Skyrizi [facility] an [facility]. [id number]; Review Type:Prior Auth; Coverage Start Date:04/05/2023; Coverage End Date:12/31/2023.       Electronically signed by Jocelynn Horvath CMA on 5/5/2023 at 9:30 AM

## 2023-06-27 ENCOUNTER — OFFICE VISIT (OUTPATIENT)
Dept: RHEUMATOLOGY | Age: 78
End: 2023-06-27
Payer: MEDICARE

## 2023-06-27 VITALS — BODY MASS INDEX: 23.19 KG/M2 | WEIGHT: 162 LBS | HEIGHT: 70 IN

## 2023-06-27 DIAGNOSIS — Z79.899 HIGH RISK MEDICATION USE: ICD-10-CM

## 2023-06-27 DIAGNOSIS — D84.9 IMMUNOSUPPRESSION (HCC): ICD-10-CM

## 2023-06-27 DIAGNOSIS — I10 ESSENTIAL HYPERTENSION: ICD-10-CM

## 2023-06-27 DIAGNOSIS — L40.50 PSORIATIC ARTHRITIS (HCC): Primary | ICD-10-CM

## 2023-06-27 DIAGNOSIS — L40.50 PSORIATIC ARTHRITIS (HCC): ICD-10-CM

## 2023-06-27 LAB
ALBUMIN SERPL-MCNC: 4.5 G/DL (ref 3.5–5.2)
ALP SERPL-CCNC: 60 U/L (ref 40–129)
ALT SERPL-CCNC: 20 U/L (ref 0–40)
ANION GAP SERPL CALCULATED.3IONS-SCNC: 12 MMOL/L (ref 7–16)
AST SERPL-CCNC: 30 U/L (ref 0–39)
BASOPHILS # BLD: 0.06 E9/L (ref 0–0.2)
BASOPHILS NFR BLD: 0.8 % (ref 0–2)
BILIRUB SERPL-MCNC: 0.6 MG/DL (ref 0–1.2)
BUN SERPL-MCNC: 18 MG/DL (ref 6–23)
CALCIUM SERPL-MCNC: 9.9 MG/DL (ref 8.6–10.2)
CHLORIDE SERPL-SCNC: 107 MMOL/L (ref 98–107)
CO2 SERPL-SCNC: 23 MMOL/L (ref 22–29)
CREAT SERPL-MCNC: 1.2 MG/DL (ref 0.7–1.2)
CRP SERPL HS-MCNC: 0.7 MG/DL (ref 0–0.4)
EOSINOPHIL # BLD: 0.07 E9/L (ref 0.05–0.5)
EOSINOPHIL NFR BLD: 0.9 % (ref 0–6)
ERYTHROCYTE [DISTWIDTH] IN BLOOD BY AUTOMATED COUNT: 12.1 FL (ref 11.5–15)
GLUCOSE SERPL-MCNC: 113 MG/DL (ref 74–99)
HCT VFR BLD AUTO: 41.8 % (ref 37–54)
HGB BLD-MCNC: 14.3 G/DL (ref 12.5–16.5)
IMM GRANULOCYTES # BLD: 0.02 E9/L
IMM GRANULOCYTES NFR BLD: 0.3 % (ref 0–5)
LYMPHOCYTES # BLD: 1.95 E9/L (ref 1.5–4)
LYMPHOCYTES NFR BLD: 25.9 % (ref 20–42)
MCH RBC QN AUTO: 34.1 PG (ref 26–35)
MCHC RBC AUTO-ENTMCNC: 34.2 % (ref 32–34.5)
MCV RBC AUTO: 99.8 FL (ref 80–99.9)
MONOCYTES # BLD: 0.86 E9/L (ref 0.1–0.95)
MONOCYTES NFR BLD: 11.4 % (ref 2–12)
NEUTROPHILS # BLD: 4.57 E9/L (ref 1.8–7.3)
NEUTS SEG NFR BLD: 60.7 % (ref 43–80)
PLATELET # BLD AUTO: 225 E9/L (ref 130–450)
PMV BLD AUTO: 11.4 FL (ref 7–12)
POTASSIUM SERPL-SCNC: 4.1 MMOL/L (ref 3.5–5)
PROT SERPL-MCNC: 7.8 G/DL (ref 6.4–8.3)
RBC # BLD AUTO: 4.19 E12/L (ref 3.8–5.8)
SODIUM SERPL-SCNC: 142 MMOL/L (ref 132–146)
WBC # BLD: 7.5 E9/L (ref 4.5–11.5)

## 2023-06-27 PROCEDURE — 1123F ACP DISCUSS/DSCN MKR DOCD: CPT | Performed by: INTERNAL MEDICINE

## 2023-06-27 PROCEDURE — G8420 CALC BMI NORM PARAMETERS: HCPCS | Performed by: INTERNAL MEDICINE

## 2023-06-27 PROCEDURE — 4004F PT TOBACCO SCREEN RCVD TLK: CPT | Performed by: INTERNAL MEDICINE

## 2023-06-27 PROCEDURE — 99215 OFFICE O/P EST HI 40 MIN: CPT | Performed by: INTERNAL MEDICINE

## 2023-06-27 PROCEDURE — G8428 CUR MEDS NOT DOCUMENT: HCPCS | Performed by: INTERNAL MEDICINE

## 2023-06-27 ASSESSMENT — ENCOUNTER SYMPTOMS
COUGH: 0
SHORTNESS OF BREATH: 0
VOMITING: 0
TROUBLE SWALLOWING: 0
NAUSEA: 0
COLOR CHANGE: 0
ABDOMINAL PAIN: 0
DIARRHEA: 0

## 2023-06-28 LAB — ERYTHROCYTE [SEDIMENTATION RATE] IN BLOOD BY WESTERGREN METHOD: 27 MM/HR (ref 0–15)

## 2023-07-01 ENCOUNTER — HOSPITAL ENCOUNTER (OUTPATIENT)
Dept: ULTRASOUND IMAGING | Age: 78
Discharge: HOME | End: 2023-07-01
Payer: OTHER GOVERNMENT

## 2023-07-01 DIAGNOSIS — R94.5 NONSPECIFIC ABNORMAL RESULTS OF LIVER FUNCTION STUDY: ICD-10-CM

## 2023-07-01 PROCEDURE — 76705 ECHO EXAM OF ABDOMEN: CPT

## 2023-09-21 DIAGNOSIS — L40.50 PSORIATIC ARTHRITIS (HCC): Primary | ICD-10-CM

## 2023-09-21 DIAGNOSIS — D84.9 IMMUNOSUPPRESSION (HCC): ICD-10-CM

## 2023-09-21 RX ORDER — RISANKIZUMAB-RZAA 150 MG/ML
1 INJECTION SUBCUTANEOUS
Qty: 3 ML | Refills: 1 | Status: SHIPPED | OUTPATIENT
Start: 2023-09-21

## 2023-09-21 RX ORDER — CLOBETASOL PROPIONATE 0.5 MG/G
OINTMENT TOPICAL
Qty: 30 G | Refills: 3 | Status: SHIPPED | OUTPATIENT
Start: 2023-09-21

## 2024-03-05 ENCOUNTER — OFFICE VISIT (OUTPATIENT)
Dept: RHEUMATOLOGY | Age: 79
End: 2024-03-05
Payer: MEDICARE

## 2024-03-05 VITALS — HEIGHT: 70 IN | BODY MASS INDEX: 23.19 KG/M2 | WEIGHT: 162 LBS

## 2024-03-05 DIAGNOSIS — L40.50 PSORIATIC ARTHRITIS (HCC): Primary | ICD-10-CM

## 2024-03-05 DIAGNOSIS — Z79.899 HIGH RISK MEDICATION USE: ICD-10-CM

## 2024-03-05 DIAGNOSIS — D84.9 IMMUNOSUPPRESSION (HCC): ICD-10-CM

## 2024-03-05 DIAGNOSIS — M15.9 GENERALIZED OSTEOARTHRITIS: ICD-10-CM

## 2024-03-05 PROCEDURE — G8484 FLU IMMUNIZE NO ADMIN: HCPCS | Performed by: INTERNAL MEDICINE

## 2024-03-05 PROCEDURE — G2211 COMPLEX E/M VISIT ADD ON: HCPCS | Performed by: INTERNAL MEDICINE

## 2024-03-05 PROCEDURE — 1123F ACP DISCUSS/DSCN MKR DOCD: CPT | Performed by: INTERNAL MEDICINE

## 2024-03-05 PROCEDURE — 99214 OFFICE O/P EST MOD 30 MIN: CPT | Performed by: INTERNAL MEDICINE

## 2024-03-05 PROCEDURE — G8427 DOCREV CUR MEDS BY ELIG CLIN: HCPCS | Performed by: INTERNAL MEDICINE

## 2024-03-05 PROCEDURE — G8420 CALC BMI NORM PARAMETERS: HCPCS | Performed by: INTERNAL MEDICINE

## 2024-03-05 PROCEDURE — 1036F TOBACCO NON-USER: CPT | Performed by: INTERNAL MEDICINE

## 2024-03-05 RX ORDER — RISANKIZUMAB-RZAA 150 MG/ML
1 INJECTION SUBCUTANEOUS
Qty: 3 ML | Refills: 1 | Status: SHIPPED | OUTPATIENT
Start: 2024-03-05

## 2024-03-05 ASSESSMENT — ENCOUNTER SYMPTOMS
NAUSEA: 0
ABDOMINAL PAIN: 0
SHORTNESS OF BREATH: 0
TROUBLE SWALLOWING: 0
DIARRHEA: 0
COUGH: 0
COLOR CHANGE: 0
VOMITING: 0

## 2024-03-05 NOTE — PROGRESS NOTES
feet as well as the shoulders.  He also had diffuse psoriasis at one point.  He was briefly on sulfasalazine which was ineffective.  He was then on Remicade for some time which worked well but for what ever reason he was switched to [place].  He has been on Otezla for several years.  He feels like it worked well initially but the last year or so he has had more joint pain and swelling in the hands in particular.  He has had a couple flares of his skin disease as well.  Currently has a little bit of scaly skin on the left foot.  He does have some swelling in the hands.  He has not had uveitis, dactylitis, enthesitis.  He has no history of IBD.  He has no extra-articular manifestations.    Past Medical History:   Diagnosis Date    Arthritis     Psoriatic arthritis (HCC) 2004        Review of Systems   Constitutional:  Negative for fatigue and fever.   HENT:  Negative for mouth sores and trouble swallowing.    Respiratory:  Negative for cough and shortness of breath.    Cardiovascular:  Negative for chest pain.   Gastrointestinal:  Negative for abdominal pain, diarrhea, nausea and vomiting.   Genitourinary:  Negative for dysuria and hematuria.   Musculoskeletal:  Positive for arthralgias. Negative for joint swelling.   Skin:  Negative for color change and rash.   Neurological:  Negative for weakness and numbness.   Hematological:  Negative for adenopathy.   All other systems reviewed and are negative.         Objective   There were no vitals filed for this visit.     Physical Exam  Constitutional:       General: He is not in acute distress.     Appearance: Normal appearance.   HENT:      Right Ear: External ear normal.      Left Ear: External ear normal.      Nose: Nose normal.   Eyes:      General: No scleral icterus.  Pulmonary:      Effort: Pulmonary effort is normal.   Musculoskeletal:         General: Deformity present. No swelling or tenderness.      Comments: He has some Heberden's nodes.  There is what is likely

## 2024-05-07 ENCOUNTER — TELEPHONE (OUTPATIENT)
Dept: RHEUMATOLOGY | Age: 79
End: 2024-05-07

## 2024-05-07 NOTE — TELEPHONE ENCOUNTER
Please review [facility] results from 05/06/24 and advise if there are any instructions for the patient.    Lab results have been scanned into media.     Electronically signed by MINA ELIAS LPN on 5/7/2024 at 3:39 PM

## 2024-09-12 ENCOUNTER — OFFICE VISIT (OUTPATIENT)
Dept: RHEUMATOLOGY | Age: 79
End: 2024-09-12
Payer: MEDICARE

## 2024-09-12 VITALS
OXYGEN SATURATION: 98 % | BODY MASS INDEX: 22.9 KG/M2 | HEART RATE: 67 BPM | WEIGHT: 160 LBS | SYSTOLIC BLOOD PRESSURE: 145 MMHG | HEIGHT: 70 IN | DIASTOLIC BLOOD PRESSURE: 67 MMHG

## 2024-09-12 DIAGNOSIS — I10 ESSENTIAL HYPERTENSION: ICD-10-CM

## 2024-09-12 DIAGNOSIS — Z79.899 HIGH RISK MEDICATION USE: ICD-10-CM

## 2024-09-12 DIAGNOSIS — L40.50 PSORIATIC ARTHRITIS (HCC): Primary | ICD-10-CM

## 2024-09-12 DIAGNOSIS — D84.9 IMMUNOSUPPRESSION (HCC): ICD-10-CM

## 2024-09-12 PROCEDURE — 1123F ACP DISCUSS/DSCN MKR DOCD: CPT | Performed by: INTERNAL MEDICINE

## 2024-09-12 PROCEDURE — G8420 CALC BMI NORM PARAMETERS: HCPCS | Performed by: INTERNAL MEDICINE

## 2024-09-12 PROCEDURE — 1036F TOBACCO NON-USER: CPT | Performed by: INTERNAL MEDICINE

## 2024-09-12 PROCEDURE — G2211 COMPLEX E/M VISIT ADD ON: HCPCS | Performed by: INTERNAL MEDICINE

## 2024-09-12 PROCEDURE — 3078F DIAST BP <80 MM HG: CPT | Performed by: INTERNAL MEDICINE

## 2024-09-12 PROCEDURE — G8428 CUR MEDS NOT DOCUMENT: HCPCS | Performed by: INTERNAL MEDICINE

## 2024-09-12 PROCEDURE — 3077F SYST BP >= 140 MM HG: CPT | Performed by: INTERNAL MEDICINE

## 2024-09-12 PROCEDURE — 99214 OFFICE O/P EST MOD 30 MIN: CPT | Performed by: INTERNAL MEDICINE

## 2024-09-12 RX ORDER — ROSUVASTATIN CALCIUM 10 MG/1
10 TABLET, COATED ORAL DAILY
COMMUNITY
Start: 2024-05-01

## 2024-09-12 RX ORDER — RISANKIZUMAB-RZAA 150 MG/ML
1 INJECTION SUBCUTANEOUS
Qty: 2 ML | Refills: 1 | Status: ACTIVE | OUTPATIENT
Start: 2024-09-12

## 2024-09-12 RX ORDER — METOPROLOL TARTRATE 25 MG/1
25 TABLET, FILM COATED ORAL
COMMUNITY
Start: 2024-06-25

## 2024-09-12 RX ORDER — ASPIRIN 81 MG/1
81 TABLET ORAL
COMMUNITY
Start: 2024-05-01

## 2024-09-12 ASSESSMENT — ENCOUNTER SYMPTOMS
COUGH: 0
TROUBLE SWALLOWING: 0
NAUSEA: 0
VOMITING: 0
ABDOMINAL PAIN: 0
SHORTNESS OF BREATH: 0
COLOR CHANGE: 0
DIARRHEA: 0

## 2025-03-13 ENCOUNTER — OFFICE VISIT (OUTPATIENT)
Dept: RHEUMATOLOGY | Age: 80
End: 2025-03-13
Payer: MEDICARE

## 2025-03-13 VITALS
HEART RATE: 83 BPM | WEIGHT: 160 LBS | HEIGHT: 70 IN | BODY MASS INDEX: 22.9 KG/M2 | SYSTOLIC BLOOD PRESSURE: 168 MMHG | DIASTOLIC BLOOD PRESSURE: 82 MMHG

## 2025-03-13 DIAGNOSIS — D84.9 IMMUNOSUPPRESSION: ICD-10-CM

## 2025-03-13 DIAGNOSIS — I10 ESSENTIAL HYPERTENSION: ICD-10-CM

## 2025-03-13 DIAGNOSIS — L40.50 PSORIATIC ARTHRITIS (HCC): Primary | ICD-10-CM

## 2025-03-13 DIAGNOSIS — Z79.899 HIGH RISK MEDICATION USE: ICD-10-CM

## 2025-03-13 DIAGNOSIS — L40.50 PSORIATIC ARTHRITIS (HCC): ICD-10-CM

## 2025-03-13 LAB
ALBUMIN: 4.7 G/DL (ref 3.5–5.2)
ALP BLD-CCNC: 72 U/L (ref 40–129)
ALT SERPL-CCNC: 40 U/L (ref 0–40)
ANION GAP SERPL CALCULATED.3IONS-SCNC: 16 MMOL/L (ref 7–16)
AST SERPL-CCNC: 73 U/L (ref 0–39)
BASOPHILS ABSOLUTE: 0.07 K/UL (ref 0–0.2)
BASOPHILS RELATIVE PERCENT: 1 % (ref 0–2)
BILIRUB SERPL-MCNC: 0.9 MG/DL (ref 0–1.2)
BUN BLDV-MCNC: 17 MG/DL (ref 6–23)
C-REACTIVE PROTEIN: <3 MG/L (ref 0–5)
CALCIUM SERPL-MCNC: 9.3 MG/DL (ref 8.6–10.2)
CHLORIDE BLD-SCNC: 102 MMOL/L (ref 98–107)
CO2: 21 MMOL/L (ref 22–29)
CREAT SERPL-MCNC: 1.3 MG/DL (ref 0.7–1.2)
EOSINOPHILS ABSOLUTE: 0.03 K/UL (ref 0.05–0.5)
EOSINOPHILS RELATIVE PERCENT: 0 % (ref 0–6)
GFR, ESTIMATED: 56 ML/MIN/1.73M2
GLUCOSE BLD-MCNC: 101 MG/DL (ref 74–99)
HCT VFR BLD CALC: 34.9 % (ref 37–54)
HEMOGLOBIN: 12 G/DL (ref 12.5–16.5)
IMMATURE GRANULOCYTES %: 0 % (ref 0–5)
IMMATURE GRANULOCYTES ABSOLUTE: <0.03 K/UL (ref 0–0.58)
LYMPHOCYTES ABSOLUTE: 2.39 K/UL (ref 1.5–4)
LYMPHOCYTES RELATIVE PERCENT: 33 % (ref 20–42)
MCH RBC QN AUTO: 35.1 PG (ref 26–35)
MCHC RBC AUTO-ENTMCNC: 34.4 G/DL (ref 32–34.5)
MCV RBC AUTO: 102 FL (ref 80–99.9)
MONOCYTES ABSOLUTE: 0.93 K/UL (ref 0.1–0.95)
MONOCYTES RELATIVE PERCENT: 13 % (ref 2–12)
NEUTROPHILS ABSOLUTE: 3.88 K/UL (ref 1.8–7.3)
NEUTROPHILS RELATIVE PERCENT: 53 % (ref 43–80)
PDW BLD-RTO: 12.6 % (ref 11.5–15)
PLATELET # BLD: 174 K/UL (ref 130–450)
PMV BLD AUTO: 11.9 FL (ref 7–12)
POTASSIUM SERPL-SCNC: 4.9 MMOL/L (ref 3.5–5)
RBC # BLD: 3.42 M/UL (ref 3.8–5.8)
SODIUM BLD-SCNC: 139 MMOL/L (ref 132–146)
TOTAL PROTEIN: 7.6 G/DL (ref 6.4–8.3)
WBC # BLD: 7.3 K/UL (ref 4.5–11.5)

## 2025-03-13 PROCEDURE — 1036F TOBACCO NON-USER: CPT | Performed by: INTERNAL MEDICINE

## 2025-03-13 PROCEDURE — G8420 CALC BMI NORM PARAMETERS: HCPCS | Performed by: INTERNAL MEDICINE

## 2025-03-13 PROCEDURE — 1159F MED LIST DOCD IN RCRD: CPT | Performed by: INTERNAL MEDICINE

## 2025-03-13 PROCEDURE — G2211 COMPLEX E/M VISIT ADD ON: HCPCS | Performed by: INTERNAL MEDICINE

## 2025-03-13 PROCEDURE — 3077F SYST BP >= 140 MM HG: CPT | Performed by: INTERNAL MEDICINE

## 2025-03-13 PROCEDURE — G8427 DOCREV CUR MEDS BY ELIG CLIN: HCPCS | Performed by: INTERNAL MEDICINE

## 2025-03-13 PROCEDURE — 3079F DIAST BP 80-89 MM HG: CPT | Performed by: INTERNAL MEDICINE

## 2025-03-13 PROCEDURE — 1123F ACP DISCUSS/DSCN MKR DOCD: CPT | Performed by: INTERNAL MEDICINE

## 2025-03-13 PROCEDURE — 99214 OFFICE O/P EST MOD 30 MIN: CPT | Performed by: INTERNAL MEDICINE

## 2025-03-13 RX ORDER — RISANKIZUMAB-RZAA 150 MG/ML
INJECTION SUBCUTANEOUS
Qty: 1 ML | Refills: 3 | Status: ACTIVE | OUTPATIENT
Start: 2025-03-13

## 2025-03-13 RX ORDER — CLOBETASOL PROPIONATE 0.5 MG/G
OINTMENT TOPICAL
Qty: 30 G | Refills: 3 | Status: SHIPPED | OUTPATIENT
Start: 2025-03-13

## 2025-03-13 ASSESSMENT — ENCOUNTER SYMPTOMS
COLOR CHANGE: 0
COUGH: 0
SHORTNESS OF BREATH: 0
ABDOMINAL PAIN: 0
NAUSEA: 0
TROUBLE SWALLOWING: 0
DIARRHEA: 0
VOMITING: 0

## 2025-03-13 NOTE — PROGRESS NOTES
The patient (or guardian, if applicable) and other individuals in attendance with the patient were advised that Artificial Intelligence will be utilized during this visit to record, process the conversation to generate a clinical note, and support improvement of the AI technology. The patient (or guardian, if applicable) and other individuals in attendance at the appointment consented to the use of AI, including the recording.                  Lalo Heredia 1945 is a 79 y.o. male, here for evaluation of the following chief complaint(s):  Follow-up (Lalo is here as a follow up for PA )      Assessment & Plan   ASSESSMENT/PLAN:    Lalo Heredia 1945 is a 79 y.o. male seen in follow-up for psoriatic arthritis.    1.  Psoriatic arthritis-continues to do very well on Skyrizi.  He has no active skin or joint disease.  We will not make any changes.  He had a reaction to [place] in the past.    2.  Immunosuppression-I recommend he stay up-to-date on vaccinations.  In the event of any acute infectious illness he should hold Skyrizi.     3.  High risk medication use-up-to-date on TB and hepatitis.  Will update labs as below.  We will monitor for infection.    4.  Hypertension-patient's with inflammatory arthritis have an increased cardiovascular risk.  He is on blood pressure medication.      1. Psoriatic arthritis (HCC)  -     clobetasol (TEMOVATE) 0.05 % ointment; Apply topically 2 times daily for no more than 2 weeks at a time, Disp-30 g, R-3, Normal  -     Risankizumab-rzaa (SKYRIZI PEN) 150 MG/ML SOAJ; Inject subcutaneous every 12 weeks thereafter, Disp-1 mL, R-3Normal  -     CBC with Auto Differential; Future  -     Comprehensive Metabolic Panel; Future  -     C-Reactive Protein; Future  -     Sedimentation Rate; Future  2. Immunosuppression  -     clobetasol (TEMOVATE) 0.05 % ointment; Apply topically 2 times daily for no more than 2 weeks at a time, Disp-30 g, R-3, Normal  -     Risankizumab-rzaa (SKYRIZI

## 2025-03-14 ENCOUNTER — RESULTS FOLLOW-UP (OUTPATIENT)
Dept: RHEUMATOLOGY | Age: 80
End: 2025-03-14

## 2025-03-14 LAB — SED RATE, AUTOMATED: 16 MM/HR (ref 0–15)
